# Patient Record
Sex: FEMALE | Race: WHITE | NOT HISPANIC OR LATINO | URBAN - METROPOLITAN AREA
[De-identification: names, ages, dates, MRNs, and addresses within clinical notes are randomized per-mention and may not be internally consistent; named-entity substitution may affect disease eponyms.]

---

## 2017-10-31 PROCEDURE — 82533 TOTAL CORTISOL: CPT | Performed by: FAMILY MEDICINE

## 2017-10-31 PROCEDURE — 82607 VITAMIN B-12: CPT | Performed by: FAMILY MEDICINE

## 2017-10-31 PROCEDURE — 82670 ASSAY OF TOTAL ESTRADIOL: CPT | Performed by: FAMILY MEDICINE

## 2017-10-31 PROCEDURE — 86376 MICROSOMAL ANTIBODY EACH: CPT | Performed by: FAMILY MEDICINE

## 2017-10-31 PROCEDURE — 83001 ASSAY OF GONADOTROPIN (FSH): CPT | Performed by: FAMILY MEDICINE

## 2017-10-31 PROCEDURE — 84144 ASSAY OF PROGESTERONE: CPT | Performed by: FAMILY MEDICINE

## 2017-10-31 PROCEDURE — 84436 ASSAY OF TOTAL THYROXINE: CPT | Performed by: FAMILY MEDICINE

## 2017-10-31 PROCEDURE — 84403 ASSAY OF TOTAL TESTOSTERONE: CPT | Performed by: FAMILY MEDICINE

## 2017-10-31 PROCEDURE — 84482 T3 REVERSE: CPT | Performed by: FAMILY MEDICINE

## 2017-10-31 PROCEDURE — 82627 DEHYDROEPIANDROSTERONE: CPT | Performed by: FAMILY MEDICINE

## 2020-10-29 ENCOUNTER — IMMUNIZATION (OUTPATIENT)
Dept: URGENT CARE | Age: 60
End: 2020-10-29

## 2020-10-29 VITALS — TEMPERATURE: 97.3 F

## 2020-10-29 DIAGNOSIS — Z23 NEED FOR VACCINATION: Primary | ICD-10-CM

## 2020-10-29 PROCEDURE — 90471 IMMUNIZATION ADMIN: CPT | Performed by: NURSE PRACTITIONER

## 2020-10-29 PROCEDURE — 90686 IIV4 VACC NO PRSV 0.5 ML IM: CPT | Performed by: NURSE PRACTITIONER

## 2023-02-02 ENCOUNTER — LAB REQUISITION (OUTPATIENT)
Dept: LAB | Facility: HOSPITAL | Age: 63
End: 2023-02-02
Payer: COMMERCIAL

## 2023-02-02 DIAGNOSIS — C50.912 MALIGNANT NEOPLASM OF UNSPECIFIED SITE OF LEFT FEMALE BREAST (HCC): ICD-10-CM

## 2023-02-02 PROCEDURE — 88307 TISSUE EXAM BY PATHOLOGIST: CPT | Performed by: SURGERY

## 2023-02-02 PROCEDURE — 88305 TISSUE EXAM BY PATHOLOGIST: CPT | Performed by: SURGERY

## 2024-02-26 ENCOUNTER — OFFICE VISIT (OUTPATIENT)
Dept: UROLOGY | Facility: CLINIC | Age: 64
End: 2024-02-26
Attending: OBSTETRICS & GYNECOLOGY
Payer: COMMERCIAL

## 2024-02-26 VITALS
HEIGHT: 64 IN | WEIGHT: 140 LBS | DIASTOLIC BLOOD PRESSURE: 70 MMHG | BODY MASS INDEX: 23.9 KG/M2 | SYSTOLIC BLOOD PRESSURE: 110 MMHG

## 2024-02-26 DIAGNOSIS — N81.6 RECTOCELE: Primary | ICD-10-CM

## 2024-02-26 PROCEDURE — 99212 OFFICE O/P EST SF 10 MIN: CPT

## 2024-02-28 ENCOUNTER — TELEPHONE (OUTPATIENT)
Dept: UROLOGY | Facility: CLINIC | Age: 64
End: 2024-02-28

## 2024-02-28 NOTE — TELEPHONE ENCOUNTER
Spoke to patient.  Carmencita does not authorize us to do any diagnostic testing, labs, or surgical procedures.  She will call me with her fax number, so I can fax her the codes for a UDS which she can have done at [facility].

## 2024-11-16 RX ORDER — PHENTERMINE HYDROCHLORIDE 37.5 MG/1
TABLET ORAL
COMMUNITY
Start: 2024-07-11 | End: 2024-11-16

## 2024-11-21 RX ORDER — VALACYCLOVIR HYDROCHLORIDE 500 MG/1
500 TABLET, FILM COATED ORAL DAILY PRN
COMMUNITY
Start: 2024-08-28 | End: 2024-11-26

## 2024-11-21 RX ORDER — ALPRAZOLAM 0.5 MG
0.5 TABLET ORAL
COMMUNITY
Start: 2024-10-19

## 2024-11-26 NOTE — DISCHARGE INSTRUCTIONS
POST OPERATIVE INFORMATION & INSTRUCTIONS FOLLOWING ROBOTIC OR OPEN ABDOMINAL SURGERY  WHAT TO EXPECT AT HOME:     Recovery from surgery is generally 2-6 weeks, but sometimes longer for more strenuous activity.  It is normal to be very tired during this time.     Recuperation varies with each individual.  Some people recover more quickly than others.  Do not be discouraged if you need a little longer to recover.  It is common to have pain along the incisions, temporary bloating/gas pain, or shoulder pain after robotic surgery. This should improve with time and activity.   It often takes several weeks before bladder function returns to normal. It is common for many patients to experience some mild leakage, need to urinate often and immediately. If these problems are persistent and bothersome, please call your doctor's office or discuss at your post-operative visit  If you also had a vaginal surgery, it is normal to have some drainage/discharge or a small amount of vaginal bleeding after surgery which may last up to 6 weeks. You may also pass small pieces of suture for 4-6 weeks after surgery.  This is normal, and stitches are absorbable.     INCISIONS  Showering and bathing:   It is okay to shower 24 hours after your surgery.   Avoid baths/swimming/hot tubs/pools for 4 weeks or until your incisions completely heal. Keeping incisions underwater can affect the healing process.   Your incisions are closed with absorbable sutures and skin glue or surgical tape on top   You may let soapy water run over the incision. There is no need to scrub the incision. Allow the skin glue or surgical tape to fall off on its own.     ACTIVITY   Lifting: No more than 10 pounds for 6 weeks. For reference, a full gallon of milk is 10 pounds. Thus, no lifting laundry, groceries, children, or pets or pushing heavy vacuums, or grocery carts.  No high impact exercises (aerobic activity, using exercise machines, weight-lifting etc.) for 6  weeks.  We encourage you to start walking regularly starting the day after surgery.   You may climb stairs as tolerated  You may return to work 1-4 weeks after surgery, depending on your job and your surgery.  Ask your doctor about your specific situation. Please contact your doctor's office if you need any [id number] letters or medical leave paperwork completed.   Do not put anything in your vagina for 6 weeks after surgery unless otherwise instructed by your doctor (including tampons, douching, sexual intercourse, etc.).     When you begin to have sexual intercourse again, use water soluble lubricants (e.g., K-Y Jelly) for a short period of time. Most of the discomfort that is experienced early improves with time; however, report any long-term discomfort to your doctor.   Do not drive until you feel that you are ready and can safely slam the brakes if needed. Do not drive while you are taking narcotic pain medication  Avoid sitting or lying in bed for more than 2 hours at a time while you are awake to reduce your risk of blood clots.     PAIN      Vaginal soreness and pelvic discomfort are normal for approximately 6 weeks after surgery.    The first 3 days after surgery we recommend that you rotate between Tylenol and ibuprofen so that you are taking one of these medications every 3 to 4 hours while awake. In this way, you can help prevent pain.   Tylenol* and Ibuprofen** should be the first medications you use for pain. Heat or ice may also help. Please take Ibuprofen with food. Some pain medications can cause constipation (see the section on constipation).   After 3 days, you can take Tylenol and Ibuprofen only as needed.   You may have been prescribed a narcotic~ pain medication (Oxycodone, Percocet, Norco, Tylenol #3, Valium, Tramadol, Hydrocodone).  Use narcotic pain medications for severe pain not improved by the above the first few days after surgery. Please transition to Tylenol or ibuprofen only  within the first 2-3 days after surgery if possible.      Pain management plan example:   Step 1: Over the counter medications  Extra strength Tylenol (500 mg) - take 2 tabs (1000 mg) every 6 hours  Ibuprofen (200 mg) - take 3 tabs (600 mg) every 6 hours    For example:   6 AM - take 1000 mg Tylenol  9AM - take 600 mg Ibuprofen  12 PM - again take 1000 mg Tylenol  3 PM - again take 600 mg Ibuprofen  So on and so on…  Step 2: Prescription pain medication  Oxycodone 5 mg: take 1 tab every 8 hours for additional pain if prescribed    PAIN MEDICATION INFORMATION:    For the first 2 days you should take Toradol and Tylenol alternating every 6 hours scheduled. Do not wait for the pain. For example take Toradol at 3pm, Tylenol at 6pm, Toradol at 9pm and so on. This way you can get ahead of any pain before it starts.    *The maximum amount of Tylenol you can take in a 24-hour period is 4000 mg. Taking over this amount could cause liver damage. Make sure that if you are taking additional narcotic pain medication it does not contain acetaminophen, the active ingredient in Tylenol. lf it does, you must account for in your daily total. For example: Norco or Percocet typically contain 325mg of Tylenol. Wean this medication as tolerated. Tylenol is processed by your liver. Do not take Tylenol if you are have a history of liver failure. Do not mix with alcohol.   **You may take 200-800mg of ibuprofen (Advil) every 8 hours as needed for pain after you are done taking the Toradol. Do not take Toradol and Ibuprofen together as they are the same type of medication. This will help with pain from inflammation (swelling). Ibuprofen and Toradol is processed by your kidneys. If you have any history of kidney disease you should avoid ibuprofen, Toradol and all types of NSAIDS (Aleve, Motrin, Advil). Please take ibuprofen with food. Avoid if you have a history of stomach ulcers. If you are taking the maximum dose of ibuprofen (800mg every 8  hours), reduce this amount to 200-400mg ibuprofen every 8 hours as your pain improves.   ~Remember that narcotics are addictive, sedating, and constipating.  You should be taking a stool softener once or twice a day while on this medication. If you are prescribed narcotic pain medication, please use the medication as instructed. Do not use more than instructed. Do not take this medication with alcohol, sedatives, anti-anxiety medications, or sleep aids.      ~ If prescribed, it is important to keep narcotic pills safely stored, as it is at great risk of being stolen or misused by family, friends, or even strangers. Please be sure to dispose of leftover pain medication after you have recovered. You may dispose of unused narcotic medications in the trash with an unpleasant substance such as coffee grounds or cat litter. You can also check FDA.gov to assess which medications can be safely flushed down the toilet.     CONSTIPATION  Miralax daily as a stool softener until you are off of narcotics and your bowel habits are back to normal.  If you have diarrhea, stop the stool softener.    If you have not had a bowel movement 2 days after surgery, you should take Milk of Magnesia, Dulcolax, Metamucil, magnesium citrate or other over-the-counter (OTC) laxatives for relief. Take laxatives with plenty of water.  Do not take Milk of Magnesia or magnesium citrate if you have chronic kidney disease.  If you had a rectocele repair, rectal pressure (feeling like you need to have a bowel movement) is also very common.  However, you should not strain to have a bowel movement or sit on the toilet for extended periods of time.  The feeling like you need to have a bowel movement may just be the swelling from surgery.   Do not use rectal suppositories if you had a rectocele repair for 4 weeks after surgery     HOME MEDICATIONS:  It is uncommon that you would receive an antibiotic prescription to use at home. You received antibiotics  during surgery while in the hospital.  Please resume all of your home medications that you were on before surgery immediately.  If you are on a blood thinning medication, please resume 1 day after surgery unless otherwise instructed.  If you were prescribed vaginal estrogen, you should resume it 6 weeks after surgery unless otherwise instructed.     URINATION AFTER SURGERY  Immediately after surgery, you may have a catheter in place. If so, you may experience urinary urgency and some bladder pressure/pain.  Although these symptoms are often associated with a urinary tract infection, they can also be caused by bladder spasms.  Call our office if you are having fevers in addition to urinary urgency, burning with urination, and bladder pain. A fever (Temp > 101.5 ? F) is more suggestive of an infection.  Before you leave the hospital, you may be asked to perform a voiding trial.  This tests your ability to urinate and empty your bladder after surgery.  If you are able to urinate, you will be discharged home without a catheter.    Even if you pass the voiding trial, there is a small chance that you can go into urinary retention (have difficulty urinating).  If you do not urinate within 4-6 hours of catheter removal and you feel like your bladder is full but you can't urinate, go to your local urologist, local emergency room, or our emergency room for catheter placement.  If this occurs, please call our office so we can schedule an appointment for another voiding trial after the swelling has had time to subside.  If you do not pass the voiding trial prior to discharge, then the nursing staff will replace a Bustamante catheter in your bladder until the swelling resolves.  You will need to follow-up in our clinic or your local urologist's office in 3-4 days to repeat the voiding trial. In some cases you may also be taught how to perform self-catheterization. If this is necessary, further instructions will be provided.    DIET  You can resume a regular diet once you are discharged.  We recommend a light diet at first if you have nausea or vomiting from pain medications or anesthesia.  We also recommend a high-fiber diet to help with bowel movements.    FOLLOW UP  Typical follow-up is between 3-4 weeks after surgery with your doctor's assistant or your doctor  Your doctor's office will contact you regarding the timing of your follow up appointment  Call the number below for your doctor during normal business hours for your follow up appointment(s)      WHEN SHOULD I CALL THE DOCTOR?  If you have a sudden onset of severe abdominal pain with nausea/vomiting please contact your doctor's office immediately.    Call your doctor if you experience any of the following symptoms:   Bright red vaginal bleeding that soaks a heavy pad  Temperature greater than 101.5 ?F (38.5?C)   Persistent vomiting   Worsening pain that is not relieved by over the counter and prescription pain medication   Large amounts of vaginal discharge that is foul smelling, yellow or green that does not improve.    If questions or concerns:   Call [phone] to reach your physician's office or send a [employer] message and either myself or one of my staff members will get back to you as soon as possible.     OR: Go to the nearest Emergency Room if you feel any signs of symptoms that warrant physician's immediate attention.     Krysta Vera DO, [facility]  [phone]       HOME INSTRUCTIONS  [facility] HOME CARE INSTRUCTIONS: POST-OP ANESTHESIA  The medication that you received for sedation or general anesthesia can last up to 24 hours. Your judgment and reflexes may be altered, even if you feel like your normal self.      We Recommend:   Do not drive any motor vehicle or bicycle   Avoid mowing the lawn, playing sports, or working with power tools/applicances (power saws, electric knives or mixers)   That you have someone stay with you on your first night home   Do  not drink alcohol or take sleeping pills or tranquilizers   Do not sign legal documents within 24 hours of your procedure   If you had a nerve block for your surgery, take extra care not to put any pressure on your arm or hand for 24 hours    It is normal:  For you to have a sore throat if you had a breathing tube during surgery (while you were asleep!). The sore throat should get better within 48 hours. You can gargle with warm salt water (1/2 tsp in 4 oz warm water) or use a throat lozenge for comfort  To feel muscle aches or soreness especially in the abdomen, chest or neck. The achy feeling should go away in the next 24 hours  To feel weak, sleepy or \"wiped out\". Your should start feeling better in the next 24 hours.   To experience mild discomforts such as sore lip or tongue, headache, cramps, gas pains or a bloated feeling in your abdomen.   To experience mild back pain or soreness for a day or two if you had spinal or epidural anesthesia.   If you had laparoscopic surgery, to feel shoulder pain or discomfort on the day of surgery.   For some patients to have nausea after surgery/anesthesia    If you feel nausea or experience vomiting:   Try to move around less.   Eat less than usual or drink only liquids until the next morning   Nausea should resolve in about 24 hours    If you have a problem when you are at home:    Call your surgeons office   Discharge Instructions: After Your Surgery  You’ve just had surgery. During surgery, you were given medicine called anesthesia to keep you relaxed and free of pain. After surgery, you may have some pain or nausea. This is common. Here are some tips for feeling better and getting well after surgery.   Going home  Your healthcare provider will show you how to take care of yourself when you go home. They'll also answer your questions. Have an adult family member or friend drive you home. For the first 24 hours after your surgery:   Don't drive or use heavy  equipment.  Don't make important decisions or sign legal papers.  Take medicines as directed.  Don't drink alcohol.  Have someone stay with you, if needed. They can watch for problems and help keep you safe.  Be sure to go to all follow-up visits with your healthcare provider. And rest after your surgery for as long as your provider tells you to.   Coping with pain  If you have pain after surgery, pain medicine will help you feel better. Take it as directed, before pain becomes severe. Also, ask your healthcare provider or pharmacist about other ways to control pain. This might be with heat, ice, or relaxation. And follow any other instructions your surgeon or nurse gives you.      Stay on schedule with your medicine.     Tips for taking pain medicine  To get the best relief possible, remember these points:   Pain medicines can upset your stomach. Taking them with a little food may help.  Most pain relievers taken by mouth need at least 20 to 30 minutes to start to work.  Don't wait till your pain becomes severe before you take your medicine. Try to time your medicine so that you can take it before starting an activity. This might be before you get dressed, go for a walk, or sit down for dinner.  Constipation is a common side effect of some pain medicines. Call your healthcare provider before taking any medicines such as laxatives or stool softeners to help ease constipation. Also ask if you should skip any foods. Drinking lots of fluids and eating foods such as fruits and vegetables that are high in fiber can also help. Remember, don't take laxatives unless your surgeon has prescribed them.  Drinking alcohol and taking pain medicine can cause dizziness and slow your breathing. It can even be deadly. Don't drink alcohol while taking pain medicine.  Pain medicine can make you react more slowly to things. Don't drive or run machinery while taking pain medicine.  Your healthcare provider may tell you to take  acetaminophen to help ease your pain. Ask them how much you're supposed to take each day. Acetaminophen or other pain relievers may interact with your prescription medicines or other over-the-counter (OTC) medicines. Some prescription medicines have acetaminophen and other ingredients in them. Using both prescription and OTC acetaminophen for pain can cause you to accidentally overdose. Read the labels on your OTC medicines with care. This will help you to clearly know the list of ingredients, how much to take, and any warnings. It may also help you not take too much acetaminophen. If you have questions or don't understand the information, ask your pharmacist or healthcare provider to explain it to you before you take the OTC medicine.   Managing nausea  Some people have an upset stomach (nausea) after surgery. This is often because of anesthesia, pain, or pain medicine, less movement of food in the stomach, or the stress of surgery. These tips will help you handle nausea and eat healthy foods as you get better. If you were on a special food plan before surgery, ask your healthcare provider if you should follow it while you get better. Check with your provider on how your eating should progress. It may depend on the surgery you had. These general tips may help:   Don't push yourself to eat. Your body will tell you when to eat and how much.  Start off with clear liquids and soup. They're easier to digest.  Next try semi-solid foods as you feel ready. These include mashed potatoes, applesauce, and gelatin.  Slowly move to solid foods. Don’t eat fatty, rich, or spicy foods at first.  Don't force yourself to have 3 large meals a day. Instead eat smaller amounts more often.  Take pain medicines with a small amount of solid food, such as crackers or toast. This helps prevent nausea.  When to call your healthcare provider  Call your healthcare provider right away if you have any of these:   You still have too much pain, or  the pain gets worse, after taking the medicine. The medicine may not be strong enough. Or there may be a complication from the surgery.  You feel too sleepy, dizzy, or groggy. The medicine may be too strong.  Side effects such as nausea or vomiting. Your healthcare provider may advise taking other medicines to .  Skin changes such as rash, itching, or hives. This may mean you have an allergic reaction. Your provider may advise taking other medicines.  The incision looks different (for instance, part of it opens up).  Bleeding or fluid leaking from the incision site, and weren't told to expect that.  Fever of 100.4°F (38°C) or higher, or as directed by your provider.  Call 911  Call 911 right away if you have:   Trouble breathing  Facial swelling    If you have obstructive sleep apnea   You were given anesthesia medicine during surgery to keep you comfortable and free of pain. After surgery, you may have more apnea spells because of this medicine and other medicines you were given. The spells may last longer than normal.    At home:  Keep using the continuous positive airway pressure (CPAP) device when you sleep. Unless your healthcare provider tells you not to, use it when you sleep, day or night. CPAP is a common device used to treat obstructive sleep apnea.  Talk with your provider before taking any pain medicine, muscle relaxants, or sedatives. Your provider will tell you about the possible dangers of taking these medicines.  Contact your provider if your sleeping changes a lot even when taking medicines as directed.

## 2024-11-27 ENCOUNTER — ANESTHESIA EVENT (OUTPATIENT)
Dept: SURGERY | Facility: HOSPITAL | Age: 64
End: 2024-11-27
Payer: COMMERCIAL

## 2024-11-27 ENCOUNTER — ANESTHESIA (OUTPATIENT)
Dept: SURGERY | Facility: HOSPITAL | Age: 64
End: 2024-11-27
Payer: COMMERCIAL

## 2024-11-27 ENCOUNTER — HOSPITAL ENCOUNTER (OUTPATIENT)
Facility: HOSPITAL | Age: 64
Setting detail: HOSPITAL OUTPATIENT SURGERY
Discharge: HOME | End: 2024-11-27
Attending: STUDENT IN AN ORGANIZED HEALTH CARE EDUCATION/TRAINING PROGRAM | Admitting: STUDENT IN AN ORGANIZED HEALTH CARE EDUCATION/TRAINING PROGRAM
Payer: COMMERCIAL

## 2024-11-27 VITALS
BODY MASS INDEX: 25.95 KG/M2 | WEIGHT: 152 LBS | HEART RATE: 74 BPM | DIASTOLIC BLOOD PRESSURE: 80 MMHG | RESPIRATION RATE: 12 BRPM | TEMPERATURE: 98 F | HEIGHT: 64 IN | SYSTOLIC BLOOD PRESSURE: 126 MMHG | OXYGEN SATURATION: 98 %

## 2024-11-27 PROCEDURE — 0USG7ZZ REPOSITION VAGINA, VIA NATURAL OR ARTIFICIAL OPENING: ICD-10-PCS | Performed by: STUDENT IN AN ORGANIZED HEALTH CARE EDUCATION/TRAINING PROGRAM

## 2024-11-27 PROCEDURE — 0DNW4ZZ RELEASE PERITONEUM, PERCUTANEOUS ENDOSCOPIC APPROACH: ICD-10-PCS | Performed by: STUDENT IN AN ORGANIZED HEALTH CARE EDUCATION/TRAINING PROGRAM

## 2024-11-27 PROCEDURE — 8E0W4CZ ROBOTIC ASSISTED PROCEDURE OF TRUNK REGION, PERCUTANEOUS ENDOSCOPIC APPROACH: ICD-10-PCS | Performed by: STUDENT IN AN ORGANIZED HEALTH CARE EDUCATION/TRAINING PROGRAM

## 2024-11-27 PROCEDURE — 0TQB4ZZ REPAIR BLADDER, PERCUTANEOUS ENDOSCOPIC APPROACH: ICD-10-PCS | Performed by: STUDENT IN AN ORGANIZED HEALTH CARE EDUCATION/TRAINING PROGRAM

## 2024-11-27 PROCEDURE — 0JQC0ZZ REPAIR PELVIC REGION SUBCUTANEOUS TISSUE AND FASCIA, OPEN APPROACH: ICD-10-PCS | Performed by: STUDENT IN AN ORGANIZED HEALTH CARE EDUCATION/TRAINING PROGRAM

## 2024-11-27 RX ORDER — ROCURONIUM BROMIDE 10 MG/ML
INJECTION, SOLUTION INTRAVENOUS AS NEEDED
Status: DISCONTINUED | OUTPATIENT
Start: 2024-11-27 | End: 2024-11-27 | Stop reason: SURG

## 2024-11-27 RX ORDER — MORPHINE SULFATE 4 MG/ML
4 INJECTION, SOLUTION INTRAMUSCULAR; INTRAVENOUS EVERY 10 MIN PRN
Status: DISCONTINUED | OUTPATIENT
Start: 2024-11-27 | End: 2024-11-27

## 2024-11-27 RX ORDER — NALOXONE HYDROCHLORIDE 0.4 MG/ML
80 INJECTION, SOLUTION INTRAMUSCULAR; INTRAVENOUS; SUBCUTANEOUS AS NEEDED
Status: DISCONTINUED | OUTPATIENT
Start: 2024-11-27 | End: 2024-11-27

## 2024-11-27 RX ORDER — BUPIVACAINE HYDROCHLORIDE 2.5 MG/ML
INJECTION, SOLUTION EPIDURAL; INFILTRATION; INTRACAUDAL AS NEEDED
Status: DISCONTINUED | OUTPATIENT
Start: 2024-11-27 | End: 2024-11-27 | Stop reason: HOSPADM

## 2024-11-27 RX ORDER — HYDROMORPHONE HYDROCHLORIDE 1 MG/ML
0.4 INJECTION, SOLUTION INTRAMUSCULAR; INTRAVENOUS; SUBCUTANEOUS EVERY 5 MIN PRN
Status: DISCONTINUED | OUTPATIENT
Start: 2024-11-27 | End: 2024-11-27

## 2024-11-27 RX ORDER — DEXAMETHASONE SODIUM PHOSPHATE 4 MG/ML
VIAL (ML) INJECTION AS NEEDED
Status: DISCONTINUED | OUTPATIENT
Start: 2024-11-27 | End: 2024-11-27 | Stop reason: SURG

## 2024-11-27 RX ORDER — HEPARIN SODIUM 5000 [USP'U]/ML
5000 INJECTION, SOLUTION INTRAVENOUS; SUBCUTANEOUS ONCE
Status: COMPLETED | OUTPATIENT
Start: 2024-11-27 | End: 2024-11-27

## 2024-11-27 RX ORDER — LIDOCAINE HYDROCHLORIDE 10 MG/ML
INJECTION, SOLUTION EPIDURAL; INFILTRATION; INTRACAUDAL; PERINEURAL AS NEEDED
Status: DISCONTINUED | OUTPATIENT
Start: 2024-11-27 | End: 2024-11-27 | Stop reason: SURG

## 2024-11-27 RX ORDER — KETOROLAC TROMETHAMINE 10 MG/1
10 TABLET, FILM COATED ORAL EVERY 6 HOURS PRN
Qty: 20 TABLET | Refills: 0 | Status: SHIPPED | OUTPATIENT
Start: 2024-11-27 | End: 2024-12-02

## 2024-11-27 RX ORDER — SODIUM CHLORIDE, SODIUM LACTATE, POTASSIUM CHLORIDE, CALCIUM CHLORIDE 600; 310; 30; 20 MG/100ML; MG/100ML; MG/100ML; MG/100ML
INJECTION, SOLUTION INTRAVENOUS CONTINUOUS
Status: DISCONTINUED | OUTPATIENT
Start: 2024-11-27 | End: 2024-11-27

## 2024-11-27 RX ORDER — ACETAMINOPHEN 500 MG
1000 TABLET ORAL ONCE
Status: COMPLETED | OUTPATIENT
Start: 2024-11-27 | End: 2024-11-27

## 2024-11-27 RX ORDER — MIDAZOLAM HYDROCHLORIDE 1 MG/ML
INJECTION INTRAMUSCULAR; INTRAVENOUS AS NEEDED
Status: DISCONTINUED | OUTPATIENT
Start: 2024-11-27 | End: 2024-11-27 | Stop reason: SURG

## 2024-11-27 RX ORDER — HYDROMORPHONE HYDROCHLORIDE 1 MG/ML
0.2 INJECTION, SOLUTION INTRAMUSCULAR; INTRAVENOUS; SUBCUTANEOUS EVERY 5 MIN PRN
Status: DISCONTINUED | OUTPATIENT
Start: 2024-11-27 | End: 2024-11-27

## 2024-11-27 RX ORDER — KETOROLAC TROMETHAMINE 30 MG/ML
INJECTION, SOLUTION INTRAMUSCULAR; INTRAVENOUS AS NEEDED
Status: DISCONTINUED | OUTPATIENT
Start: 2024-11-27 | End: 2024-11-27 | Stop reason: SURG

## 2024-11-27 RX ORDER — ONDANSETRON 2 MG/ML
4 INJECTION INTRAMUSCULAR; INTRAVENOUS EVERY 6 HOURS PRN
Status: DISCONTINUED | OUTPATIENT
Start: 2024-11-27 | End: 2024-11-27

## 2024-11-27 RX ORDER — MORPHINE SULFATE 10 MG/ML
6 INJECTION, SOLUTION INTRAMUSCULAR; INTRAVENOUS EVERY 10 MIN PRN
Status: DISCONTINUED | OUTPATIENT
Start: 2024-11-27 | End: 2024-11-27

## 2024-11-27 RX ORDER — PROCHLORPERAZINE EDISYLATE 5 MG/ML
5 INJECTION INTRAMUSCULAR; INTRAVENOUS EVERY 8 HOURS PRN
Status: DISCONTINUED | OUTPATIENT
Start: 2024-11-27 | End: 2024-11-27

## 2024-11-27 RX ORDER — PHENYLEPHRINE HCL 10 MG/ML
VIAL (ML) INJECTION AS NEEDED
Status: DISCONTINUED | OUTPATIENT
Start: 2024-11-27 | End: 2024-11-27 | Stop reason: SURG

## 2024-11-27 RX ORDER — MORPHINE SULFATE 4 MG/ML
2 INJECTION, SOLUTION INTRAMUSCULAR; INTRAVENOUS EVERY 10 MIN PRN
Status: DISCONTINUED | OUTPATIENT
Start: 2024-11-27 | End: 2024-11-27

## 2024-11-27 RX ORDER — ONDANSETRON 2 MG/ML
INJECTION INTRAMUSCULAR; INTRAVENOUS AS NEEDED
Status: DISCONTINUED | OUTPATIENT
Start: 2024-11-27 | End: 2024-11-27 | Stop reason: SURG

## 2024-11-27 RX ORDER — TROSPIUM CHLORIDE ER 60 MG/1
60 CAPSULE ORAL DAILY
Qty: 30 CAPSULE | Refills: 0 | Status: SHIPPED | OUTPATIENT
Start: 2024-11-27 | End: 2024-12-27

## 2024-11-27 RX ORDER — PHENAZOPYRIDINE HYDROCHLORIDE 200 MG/1
200 TABLET, FILM COATED ORAL ONCE
Status: COMPLETED | OUTPATIENT
Start: 2024-11-27 | End: 2024-11-27

## 2024-11-27 RX ORDER — HYDROMORPHONE HYDROCHLORIDE 1 MG/ML
0.6 INJECTION, SOLUTION INTRAMUSCULAR; INTRAVENOUS; SUBCUTANEOUS EVERY 5 MIN PRN
Status: DISCONTINUED | OUTPATIENT
Start: 2024-11-27 | End: 2024-11-27

## 2024-11-27 RX ADMIN — LIDOCAINE HYDROCHLORIDE 50 MG: 10 INJECTION, SOLUTION EPIDURAL; INFILTRATION; INTRACAUDAL; PERINEURAL at 07:44:00

## 2024-11-27 RX ADMIN — PHENYLEPHRINE HCL 100 MCG: 10 MG/ML VIAL (ML) INJECTION at 09:12:00

## 2024-11-27 RX ADMIN — ROCURONIUM BROMIDE 50 MG: 10 INJECTION, SOLUTION INTRAVENOUS at 07:44:00

## 2024-11-27 RX ADMIN — MIDAZOLAM HYDROCHLORIDE 2 MG: 1 INJECTION INTRAMUSCULAR; INTRAVENOUS at 07:40:00

## 2024-11-27 RX ADMIN — DEXAMETHASONE SODIUM PHOSPHATE 8 MG: 4 MG/ML VIAL (ML) INJECTION at 07:58:00

## 2024-11-27 RX ADMIN — SODIUM CHLORIDE, SODIUM LACTATE, POTASSIUM CHLORIDE, CALCIUM CHLORIDE: 600; 310; 30; 20 INJECTION, SOLUTION INTRAVENOUS at 10:08:00

## 2024-11-27 RX ADMIN — ONDANSETRON 4 MG: 2 INJECTION INTRAMUSCULAR; INTRAVENOUS at 07:58:00

## 2024-11-27 RX ADMIN — KETOROLAC TROMETHAMINE 30 MG: 30 INJECTION, SOLUTION INTRAMUSCULAR; INTRAVENOUS at 10:17:00

## 2024-11-27 RX ADMIN — ROCURONIUM BROMIDE 20 MG: 10 INJECTION, SOLUTION INTRAVENOUS at 09:30:00

## 2024-11-27 RX ADMIN — ROCURONIUM BROMIDE 30 MG: 10 INJECTION, SOLUTION INTRAVENOUS at 08:52:00

## 2024-11-27 NOTE — ANESTHESIA POSTPROCEDURE EVALUATION
Patient: Sonal Raya    Procedure Summary       Date: 11/27/24 Room / Location: [facility] MAIN OR 07 / [facility] MAIN OR    Anesthesia Start: 0740 Anesthesia Stop: 1046    Procedures:       XI robotic assisted hysterorhaphy, 1.5 hours extensive lysis of adhesions, posterior repair, cystoscopy, bladder repair, sacrospinous ligament fixation, perineorrhaphy (Abdomen)      ANTERIOR POSTERIOR REPAIR (Vagina )      CYSTOSCOPY (Bladder) Diagnosis: (Midline cystocele, rectocele, vaginal vault prolapse after hysterectomy)    Surgeons: Krysta Vera DO Anesthesiologist: Samson Man MD    Anesthesia Type: general ASA Status: 2            Anesthesia Type: general    Vitals Value Taken Time   /84 11/27/24 1044   Temp 98.1 11/27/24 1046   Pulse 100 11/27/24 1046   Resp 17 11/27/24 1046   SpO2 96 % 11/27/24 1046   Vitals shown include unfiled device data.    [facility] AN Post Evaluation:   Patient Evaluated in PACU  Patient Participation: complete - patient participated  Level of Consciousness: awake  Pain Score: 0  Pain Management: adequate  Airway Patency:patent  Yes    Nausea/Vomiting: none  Cardiovascular Status: acceptable  Respiratory Status: acceptable  Postoperative Hydration acceptable      Cony Vera CRNA  11/27/2024 10:46 AM

## 2024-11-27 NOTE — H&P
64yo F with prolapse. H/o open GAEL in 199 with a bladder \"lift\" at that time. Also has UUI and constipation. Feels bulge terribly when has to have a BM    Abd surg- open hys then ovaries out later. Has lower midline scar. Had mesh twice in the left inguinal region for incisional hernia    No DWAYNE    10 point ROS was completed and otherwise negative unless states in HPI    History/Other:     Current Outpatient Medications   Medication Sig Dispense Refill   ALPRAZolam 0.5 MG Oral Tab Take 1 tablet (0.5 mg total) by mouth daily as needed for Anxiety. 30 tablet 0   albuterol (PROAIR HFA) 108 (90 Base) MCG/ACT Inhalation Aero Soln Inhale 2 puffs into the lungs every 4 (four) hours as needed for Wheezing. 3 each 3   DAILY MULTIVITAMIN OR 1 po daily     Past Medical History:   Diagnosis Date   Breast cancer (HCC) 02/08/2023   lt IDC   Breast cyst   bilateral breast   CHICKEN POX   as a child   Diverticulitis 2007   Herpes   Inverted nipple   left   Melanoma in situ (HCC) 2000   on arm     Past Surgical History:   Procedure Laterality Date   APPENDECTOMY 1972   COLONOSCOPY 2007   COLONOSCOPY 09/07/2021   Diverticulosis Repeat 2031   COLONOSCOPY N/A 09/07/2021   Procedure: COLONOSCOPY,; Surgeon: Nathan Guerrero MD; Location: [facility], [facility]   HYSTERECTOMY 1999   HYSTERECTOMY 2002,2006   oophorectomy   LUMPECTOMY LEFT Left 02/2023   IDC   NEEDLE BIOPSY LEFT 1998   NEEDLE BIOPSY RIGHT Right 12/2022   fibroadenoma   OTHER SURGICAL HISTORY   incisional hernia repairs   OTHER SURGICAL HISTORY Left 02/2023   Left Breast needle loc lumpectomy   RADIATION LEFT Left 03/2023   lt IDC   SKIN SURGERY 02/29/2016   SCC to left mid eyebrow/ MOHS by AB     Social History  Tobacco Use  Smoking status: Never  Smokeless tobacco: Never  Vaping Use  Vaping status: Never Used  Alcohol use: Yes  Comment: Occa   Drug use: No      Family History   Problem Relation Age of Onset   Breast Cancer Paternal Grandmother 70   age 70s   Cancer Mother    cervical     Objective:     GENERAL: well developed, well nourished, in no apparent distress  HEENT: atraumatic, normocephalic  LUNGS: normal respiratory motion without distress  CARDIO:NA  ABDOMEN: Soft, non-tender, non-distended  : Normal external genitalia with no lesions or discharge: yes; Vaginal Atrophy no  Ba +1 C -2 Bp +1 gh 5 TVL 6  Pelvic Floor TTP: none  DWAYNE: none  Masses Appreciated: no  NEURO: Alert, oriented, no focal deficits   EXTREMITIES: Edema no    Cystometrics: no    Lab Results   Component Value Date   GLUCOSEDIP n 02/28/2018   BILIRUBIN n 02/28/2018   KETONESDIP n 02/28/2018   SPECGRAVITY >=1.030 02/28/2018   BLOODU n 02/28/2018   PHURINE 5.0 02/28/2018   PROTEINDIP n 02/28/2018   UROBILIN 0.2 02/28/2018   NITRITE n 02/28/2018   LEUKOCYTES n 02/28/2018       PVR: 10ml        Assessment & Plan:     64yo F  Diagnoses and all orders for this visit:    Midline cystocele    Urge incontinence of urine  - EVALUATE & TREAT, URLGY (DULY)  - MEASUREMENT, POST-VOIDING RESIDUAL URINE &/OR BLADDER CAPACITY, US, NON-IMAGING    Rectocele    Vaginal vault prolapse after hysterectomy    Plan:  Assessment/Plan:  1. Pelvic organ prolapse  Discussed the natural history of pelvic organ prolapse. Prolapse is unlikely to self-resolve, although may not necessarily worsen with time with care in avoidance of abdominal straining, heavy lifting, and weight gain. All the treatment options were reviewed including     1) Watchful waiting particularly if symptoms are non-bothersome. Should have a good bowel regimen, avoiding constipation and straining. Avoiding heavy weightlifting if possible. Exhaling with lifting to decrease transmission of force to pelvis.     2) Kegel exercise/strenthing - would not anatomically correct the prolapse but may decrease the sensation of a vaginal bulge.     3) pessary placement- would require removal and cleaning regularly (not necessarily daily) that could be performed by the patient  with proper instruction or managed by our physician assistant.     4) Surgical repair - which would be an outpatient procedure and require pelvic rest + lifting restrictions for at least 6 weeks.     -Surgical options including vaginal, robotic and open abdominal operations were discussed at length as well as the risks and benefits incurred by treatment option.    -A concurrent mid-urethral sling procedure was also discussed for the treatment of DWAYNE/prevention of de tony DWAYNE after prolapse repair. All relevant recent data discussed with patient as well as risks associate with mesh.    Given the above the patient has elected to undergo RASC, posterior repair, perineorrhaphy after thorough education provided    PVR low  The patient indicates understanding of these issues and agrees to the plan.     Krysta Vera DO, [facility]

## 2024-11-27 NOTE — INTERVAL H&P NOTE
Pre-op Diagnosis: Midline cystocele, rectocele, vaginal vault prolapse after hysterectomy    The above referenced H&P was reviewed by Krysta Vera DO on 11/27/2024, the patient was examined and no significant changes have occurred in the patient's condition since the H&P was performed.  I discussed with the patient and/or legal representative the potential benefits, risks and side effects of this procedure; the likelihood of the patient achieving goals; and potential problems that might occur during recuperation.  I discussed reasonable alternatives to the procedure, including risks, benefits and side effects related to the alternatives and risks related to not receiving this procedure.  We will proceed with procedure as planned.

## 2024-11-27 NOTE — OPERATIVE REPORT
[facility]  part of [facility]    Operative Note         Sonal Raya Location: OR   [facility] [id number] MRN [id number]   Admission Date 11/27/2024 Operation Date 11/27/2024   Attending Physician Krysta Vera DO       Patient Name: Sonal Raya     Preoperative Diagnosis: Midline cystocele, rectocele, vaginal vault prolapse after hysterectomy     Postoperative Diagnosis: Midline cystocele, rectocele, vaginal vault prolapse after hysterectomy     Procedure(s):  XI robotic assisted cystorrhaphy, 1.5 hours extensive lysis of adhesions, posterior repair, cystoscopy, sacrospinous ligament fixation, perineorrhaphy    Primary Surgeon: Krysta Vera DO     Surgical Assistant.: Travis Camarillo CSA     Anesthesia: General     Specimen: none    Estimated Blood Loss: No data recorded   Complications: Bladder adhesed to omentum and bowel all to abdominal wall from previous incisional mass surgeries causing extensive adhesions.  Given the severity of her head lesions the bladder was injured during adhesion takedown.  It was closed in 2 layers and confirmed to be watertight    Surgical Findings:   -Omentum, bladder and bowel completely adhesed to anterior abdominal wall giving no surgical planes.  Extensive lysis of adhesions for over an hour and a half and there is still an extensive amount left.  The bladder was inadvertently injured during takedown of these adhesions.  It was closed in 2 layers with watertight closure at that was tested both with cystoscopy and backfilling the bladder  -Given extensive adhesions it was decided to abort the robotic sacrocolpopexy with risk of more severe injury to the bowel or vessels  -Converted to transvaginal sacrospinous ligament fixation and posterior repair after consent obtained from daughter  -Cystoscopy confirmed good closure of the bladder and catheter was left in place    Complexity: Complex see surgical findings secondary to severe abdominal adhesions  from 2 previous incisional hernia repairs with mesh    Operative Summary:      65yo female with bothersome stage IIB prolapse after hysterectomy.. She was counseled on management options for the prolapse including abdominal and vaginal approaches.  Patient has a history of extensive abdominal surgeries including open hysterectomy in 2 incisional hernia repairs with mesh.  Patient understands the risk of injury to the bladder small bowel or colon given her extensive surgical history.  She also understands that if I am unable to perform it robotically that I would move to a vaginal approach to repair her prolapse. She elected to proceed in fully informed fashion after discussion of procedures, alternatives, benefits, and risks.    An 16-Fr catheter was placed in the bladder. We began gaining access to the abdomen with a Veress needle in the midline. The abdomen was insufflated and an 8mm port was then introduced. The abdominal cavity was inspected with a 30-degree camera. Two additional 8 mm robotic ports were placed on the patient's left side. An additional robotic port was placed on the patient's right side and an 8 mm assistant airseal port. The skin was infiltrated with Marcaine prior to an incision at each port site. Each port was then visually watched in. Port sites were at least 8-9 cm away from each other in a straight across configuration. Airseal was used for the case.  Before docking the robot we inspected the abdomen and there was noted to be severe adhesions all throughout the entire anterior abdominal wall of the omentum and bowel scarred to previous incisional hernia mesh.  Some of these adhesions were taken down sharply laparoscopically before docking the robot so that we could get our robotic arms in.  Once we had enough room to place the robotic arms we then docked the robot.       Once the robotic arms were docked, monopolar mason, bipolar graspers and a prograsp were placed in the arms.  I spent  1.5 hours taking down the extensive adhesions of the omentum bowel and bladder on the anterior abdominal wall.  Everything was very stuck together there was no planes.  The bladder was adhered to the omentum and bowel and while I was taking down the adhesions I inadvertently got into the bladder.  I repaired this with a running 3-0 Vicryl suture and then imbricated the muscle over top with a running 2-0 Vicryl suture.  I then tested my closure by backfilling the catheter and there was no leak noted.  At this point we resurveyed what we still had to do.  Not only would we have to get the bladder off of the omentum and bowel with the risk of getting into the bladder again or getting into the bowel there was also extensive small bowel adhesions below this in the pelvis.  Given all of this I called the daughter and told her what we had found intraoperatively and we agreed to abort the robotic procedure and fix her prolapse transvaginally instead.    The robot was then undocked and all port sites were closed with 4-0 Monocryl.  A cystoscopy was performed with a 70 degree lens which confirmed good closure of the bladder injury.  The bladder injury was noted to be at the left dome.    I then went down below and performed a sacrospinous ligament fixation, posterior repair and perineorrhaphy to repair her prolapse.  I did this by using a [place] for retraction.  A suture was placed to zia the area that reached for later reference. The was noted cystocele that it would be completely reduced after suspension of the apex therefore an anterior repair did not need to be performed.       1% lidocaine with 1:100K epinephrine was injected into the posterior vaginal plane for hydrodissection.  A 15 blade was used to make a midline incision along the posterior vaginal wall.  A flap of the posterior vaginal wall was developed on the patient's right and left side using sharp dissection along the avascaular pre-rectal fascial plane.   The dissection was further continued using blunt dissection and the right sacrospinous ligament was palpated. We then identified the ischial spine and easily palpated the sacrospinous ligment.      Two 0-0 PDS sutures were placed through the ligament with the aid of the Capio needle . The free ends of the sutures were then passed through the posterior vaginal cuff.     The rectovaginal fascia was then plicated in the midline with a series of figure-of-eight 0 Vicryl sutures. The posterior vaginal wall incision was then closed with a running-locking 2-0 Vicryl stitch.  The suspension sutures were then tied down elevating the apex nicely with complete reduction of all prolapse    A brooks piece of posterior vaginal wall epithelium beginning at the introitus was sharply dissected off. The de-epithelialized defect was closed proximally with buried, figure-of-eight 0-0 Vicryl sutures.  The  perineal defect and posterior vaginal epithelium was then closed in a 2-0 vicryl in a runningsuture.    The vaca catheter was left in place and will remain in place for 7 to 10 days at that time we will perform a cystogram and she will follow-up with me in the office. The count was correct and the patient was transferred to PACU in stable condition.    The patient was then placed back in the supine position, extubated, transferred to a stretcher and then to PACU.  The procedure was performed without any major complications.          Implants: * No implants in log *     Drains: 16 Kinyarwanda Vaca catheter    Condition: Stable to PACU      Krysta Vera DO

## 2024-11-27 NOTE — ANESTHESIA PROCEDURE NOTES
Airway  Date/Time: 11/27/2024 7:45 AM  Urgency: Elective      General Information and Staff    Patient location during procedure: OR  Resident/CRNA: Cony Vera CRNA  Performed: CRNA   Performed by: Cony Vera CRNA  Authorized by: Samson Man MD      Indications and Patient Condition  Indications for airway management: anesthesia  Sedation level: deep  Preoxygenated: yes  Patient position: sniffing  Mask difficulty assessment: 1 - vent by mask    Final Airway Details  Final airway type: endotracheal airway      Successful airway: ETT  Cuffed: yes   Successful intubation technique: direct laryngoscopy  Endotracheal tube insertion site: oral  Blade size: #4  ETT size (mm): 7.0    Cormack-Lehane Classification: grade I - full view of glottis  Placement verified by: capnometry   Measured from: teeth  ETT to teeth (cm): 20  Number of attempts at approach: 1    Additional Comments  Atraumatic intubation. Teeth and lips in preop condition.

## 2024-11-27 NOTE — ANESTHESIA PREPROCEDURE EVALUATION
Anesthesia PreOp Note    HPI:     Sonal Raya is a 64 year old female who presents for preoperative consultation requested by: Krysta Vera DO    Date of Surgery: 11/27/2024    Procedure(s):  XI robotic assisted sacrocolpopexy, posterior repair, cystoscopy  ANTERIOR POSTERIOR REPAIR  CYSTOSCOPY  Indication: Midline cystocele, rectocele, vaginal vault prolapse after hysterectomy    Relevant Problems   No relevant active problems       NPO:  Last Liquid Consumption Date: 11/26/24  Last Liquid Consumption Time: 2000  Last Solid Consumption Date: 11/26/24  Last Solid Consumption Time: 2000  Last Liquid Consumption Date: 11/26/24          History Review:  Patient Active Problem List    Diagnosis Date Noted    Symptomatic menopausal or female climacteric states 01/18/2016    Personal history of other malignant neoplasm of skin 12/14/2015    Personal history of malignant melanoma of skin 12/14/2015    Insomnia 10/11/2013       Past Medical History:    Breast cyst    bilateral breast    CHICKEN POX    as a child    Diverticulitis    Exposure to medical diagnostic radiation    Herpes    History of blood transfusion    Incontinence    Inverted nipple    left    Melanoma in situ (HCC)    on arm, left breast cancer    PONV (postoperative nausea and vomiting)       Past Surgical History:   Procedure Laterality Date    Appendectomy  1972    Colonoscopy  2007    Colonoscopy  09/07/2021    Diverticulosis               Repeat 2031    Colonoscopy N/A 09/07/2021    Procedure: COLONOSCOPY,;  Surgeon: Nathan Guerrero MD;  [facility]: [facility] [place]    Hysterectomy  1999    Hysterectomy  2002,2006    oophorectomy    Lumpectomy left      Needle biopsy left  1998    Other surgical history      incisional hernia repairs    Skin surgery  02/29/2016    SCC to left mid eyebrow/ MOHS by AB       Prescriptions Prior to Admission[1]  Current Medications and Prescriptions Ordered in Epic[2]    Allergies[3]    Family History    Problem Relation Age of Onset    No Known Problems Father     Cancer Mother         cervical    Breast Cancer Paternal Grandmother 70        age 70s     Social History     Socioeconomic History    Marital status:    Tobacco Use    Smoking status: Never    Smokeless tobacco: Never   Vaping Use    Vaping status: Never Used   Substance and Sexual Activity    Alcohol use: Yes     Comment: Occa     Drug use: No    Sexual activity: Yes     Partners: Male     Birth control/protection: Hysterectomy       Available pre-op labs reviewed.             Vital Signs:  Body mass index is 26.09 kg/m².   height is 1.626 m (5' 4\") and weight is 68.9 kg (152 lb). Her oral temperature is 97.6 °F (36.4 °C). Her blood pressure is 129/92 (abnormal) and her pulse is 75. Her respiration is 15 and oxygen saturation is 96%.   Vitals:    11/21/24 1046 11/27/24 0551   BP:  (!) 129/92   Pulse:  75   Resp:  15   Temp:  97.6 °F (36.4 °C)   TempSrc:  Oral   SpO2:  96%   Weight: 65.8 kg (145 lb) 68.9 kg (152 lb)   Height: 1.626 m (5' 4\")         Anesthesia Evaluation     Patient summary reviewed and Nursing notes reviewed    Airway   Mallampati: I  Dental      Pulmonary - negative ROS and normal exam   Cardiovascular - normal exam  Exercise tolerance: good    NYHA Classification: I    Neuro/Psych - negative ROS     GI/Hepatic/Renal - negative ROS     Endo/Other - negative ROS   Abdominal                  Anesthesia Plan:   ASA:  2  Plan:   General  Airway:  ETT  Post-op Pain Management: IV analgesics      I have informed Sonal Raya and/or legal guardian or family member of the nature of the anesthetic plan, benefits, risks including possible dental damage if relevant, major complications, and any alternative forms of anesthetic management.   All of the patient's questions were answered to the best of my ability. The patient desires the anesthetic management as planned.  BERNARD ABAD MD  11/27/2024 7:14 AM  Present on  Admission:  **None**           [1]   Medications Prior to Admission   Medication Sig Dispense Refill Last Dose/Taking    ALPRAZolam 0.5 MG Oral Tab Take 1 tablet (0.5 mg total) by mouth daily as needed.   2024 at  7:00 PM    [] valACYclovir 500 MG Oral Tab Take 1 tablet (500 mg total) by mouth daily as needed.   More than a month   [2]   Current Facility-Administered Medications Ordered in Epic   Medication Dose Route Frequency Provider Last Rate Last Admin    lactated ringers infusion   Intravenous Continuous Krysta Vera DO 20 mL/hr at 24 0615 New Bag at 24 0615    ceFAZolin (Ancef) 2g in 10mL IV syringe premix  2 g Intravenous Once Krysta Vera DO         No current [facility]-ordered outpatient medications on file.   [3]   Allergies  Allergen Reactions    Pcns [Penicillins] HIVES     No skin peeling/blisters ; no organ involvement

## (undated) DEVICE — ANTIBACTERIAL VIOLET BRAIDED (POLYGLACTIN 910), SYNTHETIC ABSORBABLE SUTURE: Brand: COATED VICRYL

## (undated) DEVICE — SUT PROL 0 30IN CT-2 NABSRB BLU L26MM 1/2 CIR

## (undated) DEVICE — SOLUTION IRRIG 1000ML ST H2O AQUALITE PLAS

## (undated) DEVICE — SOLUTION IV 1000ML DIL ST H2O

## (undated) DEVICE — TIP COVER ACCESSORY

## (undated) DEVICE — BLADELESS OBTURATOR: Brand: WECK VISTA

## (undated) DEVICE — SOLUTION IRRIG 1000ML 0.9% NACL USP BTL

## (undated) DEVICE — DRAPE,ROBOTICS,STERILE: Brand: MEDLINE

## (undated) DEVICE — INTENT TO BE USED WITH SUTURE MATERIAL FOR TISSUE CLOSURE: Brand: RICHARD-ALLAN® NEEDLE 3/8 CIRCLE TROCAR

## (undated) DEVICE — MONOPOLAR CURVED SCISSORS: Brand: ENDOWRIST

## (undated) DEVICE — SUT PDS II 2-0 27IN ABSRB VLT L26MM CT-2

## (undated) DEVICE — GAMMEX® PI HYBRID SIZE 6, STERILE POWDER-FREE SURGICAL GLOVE, POLYISOPRENE AND NEOPRENE BLEND: Brand: GAMMEX

## (undated) DEVICE — SYRINGE,TOOMEY,IRRIGATION,70CC,STERILE: Brand: MEDLINE

## (undated) DEVICE — TRAY CATH FOLEY 16FR INCLUDE BARDX IC COMPLT CARE

## (undated) DEVICE — SUTURE CAPTURING DEVICE: Brand: CAPIO SLIM

## (undated) DEVICE — STERILE H2O FOR IRRIG .

## (undated) DEVICE — GLOVE SUR 6.5 SENSICARE PI PIP CRM PWD F

## (undated) DEVICE — ABSORBABLE WOUND CLOSURE DEVICE: Brand: V-LOC 180

## (undated) DEVICE — PACK CUSTOM CYSTO

## (undated) DEVICE — VIOLET BRAIDED (POLYGLACTIN 910), SYNTHETIC ABSORBABLE SUTURE: Brand: COATED VICRYL

## (undated) DEVICE — ROBOTIC: Brand: MEDLINE INDUSTRIES, INC.

## (undated) DEVICE — APPLICATOR ENDOSCP FOR ADJUNCTIVE HEMSTAS

## (undated) DEVICE — CANNULA SEAL

## (undated) DEVICE — COLUMN DRAPE

## (undated) DEVICE — VISUALIZATION SYSTEM: Brand: CLEARIFY

## (undated) DEVICE — AIRSEAL 8 MM ACCESS PORT AND LOW PROFILE OBTURATOR WITH BLADELESS OPTICAL TIP, 120 MM LENGTH: Brand: AIRSEAL

## (undated) DEVICE — SLEEVE COMPR MD KNEE LEN SGL USE KENDALL SCD

## (undated) DEVICE — SUT MCRYL 4-0 18IN PS-2 ABSRB UD 19MM 3/8 CIR

## (undated) DEVICE — DRAPE,LITHOTOMY,STERILE: Brand: MEDLINE

## (undated) DEVICE — INSUFFLATION NEEDLE TO ESTABLISH PNEUMOPERITONEUM.: Brand: INSUFFLATION NEEDLE

## (undated) DEVICE — DRAPE,UNDRBUT,WHT GRAD PCH,CAPPORT,20/CS: Brand: MEDLINE

## (undated) DEVICE — ELECTRODE ES RET 2 PATE W/ 10FT CRD MPLR DISP

## (undated) DEVICE — Device

## (undated) DEVICE — GLOVE SUR 7 SENSICARE PI PIP GRN PWD F

## (undated) DEVICE — ADHESIVE SKIN TOP FOR WND CLSR DERMBND ADV

## (undated) DEVICE — GAUZE,SPONGE,4"X4",16PLY,XRAY,STRL,LF: Brand: MEDLINE

## (undated) DEVICE — SKIN PREP TRAY 4 COMPARTM TRAY: Brand: MEDLINE INDUSTRIES, INC.

## (undated) DEVICE — JELLY,LUBE,STERILE,FLIP TOP,TUBE,2-OZ: Brand: MEDLINE

## (undated) DEVICE — SOLUTION IRRIG 3000ML 0.9% NACL FLX CONT

## (undated) DEVICE — LARGE NEEDLE DRIVER: Brand: ENDOWRIST

## (undated) DEVICE — YANKAUER,FLEXIBLE HANDLE,REGLR CAPACITY: Brand: MEDLINE INDUSTRIES, INC.

## (undated) DEVICE — ARM DRAPE

## (undated) DEVICE — 20 ML SYRINGE LUER-LOCK TIP: Brand: MONOJECT

## (undated) DEVICE — COUNTER NDL 10 CT HLD 20 FOAM BLK ADH

## (undated) DEVICE — POWDER HEMSTAT 3GM OXIDIZED REGENERATED CELOS

## (undated) DEVICE — TUR Y CYSTO TUBING SET IRRIG L96IN

## (undated) DEVICE — MEGA SUTURECUT ND: Brand: ENDOWRIST

## (undated) DEVICE — BIPOLAR GRASPER, LONG: Brand: ENDOWRIST

## (undated) DEVICE — NDLCTR: FOAM/ADHESIVE 10CT 96/CS: Brand: MEDICAL ACTION INDUSTRIES